# Patient Record
Sex: FEMALE | Race: WHITE | NOT HISPANIC OR LATINO | Employment: UNEMPLOYED | URBAN - METROPOLITAN AREA
[De-identification: names, ages, dates, MRNs, and addresses within clinical notes are randomized per-mention and may not be internally consistent; named-entity substitution may affect disease eponyms.]

---

## 2022-11-12 ENCOUNTER — OFFICE VISIT (OUTPATIENT)
Dept: URGENT CARE | Facility: CLINIC | Age: 62
End: 2022-11-12
Payer: COMMERCIAL

## 2022-11-12 VITALS
RESPIRATION RATE: 14 BRPM | WEIGHT: 136.4 LBS | HEART RATE: 52 BPM | HEIGHT: 62 IN | SYSTOLIC BLOOD PRESSURE: 122 MMHG | DIASTOLIC BLOOD PRESSURE: 80 MMHG | TEMPERATURE: 97.2 F | BODY MASS INDEX: 25.1 KG/M2 | OXYGEN SATURATION: 99 %

## 2022-11-12 DIAGNOSIS — S91.312A LACERATION OF LEFT FOOT, INITIAL ENCOUNTER: ICD-10-CM

## 2022-11-12 PROCEDURE — 90714 TD VACC NO PRESV 7 YRS+ IM: CPT

## 2022-11-12 PROCEDURE — 12002 RPR S/N/AX/GEN/TRNK2.6-7.5CM: CPT

## 2022-11-12 PROCEDURE — 90471 IMMUNIZATION ADMIN: CPT

## 2022-11-14 NOTE — PROGRESS NOTES
"Subjective:   Saira Pozo is a 62 y.o. female who presents for Foot Injury (X 1 day, cut on bottom on left foot)      HPI: This is a 62-year-old female who presents today for laceration to left foot.  Patient reports stepping on a piece of glass approximately 1 hour ago.  Patient reports pain is minimal.  She is not up-to-date on tetanus.  Numbness and tingling to left foot    ROS per HPI    Medications:    No current outpatient medications on file prior to visit.     No current facility-administered medications on file prior to visit.        Allergies:   Patient has no known allergies.    Problem List:   There is no problem list on file for this patient.       Surgical History:  No past surgical history on file.    Past Social Hx:   Social History     Tobacco Use    Smoking status: Never    Smokeless tobacco: Never   Vaping Use    Vaping Use: Never used   Substance Use Topics    Alcohol use: Not Currently    Drug use: Not Currently          Problem list, medications, and allergies reviewed by myself today in Epic.     Objective:     /80 (BP Location: Left arm, Patient Position: Sitting, BP Cuff Size: Adult)   Pulse (!) 52   Temp 36.2 °C (97.2 °F) (Temporal)   Resp 14   Ht 1.575 m (5' 2\")   Wt 61.9 kg (136 lb 6.4 oz)   SpO2 99%   BMI 24.95 kg/m²     Physical Exam  Vitals and nursing note reviewed.   Musculoskeletal:        Feet:    Feet:      Comments: 5 cm superficial laceration to bottom of left foot.  Bleeding controlled.  No foreign body identified   Skin:     General: Skin is warm and dry.      Capillary Refill: Capillary refill takes less than 2 seconds.   Neurological:      General: No focal deficit present.      Mental Status: She is oriented to person, place, and time. Mental status is at baseline.   Psychiatric:         Mood and Affect: Mood normal.         Behavior: Behavior normal.         Thought Content: Thought content normal.         Judgment: Judgment normal.   Procedure: " Laceration Repair of laceration of left foot  - Risks, benefits, methods, and alternatives of primary wound closure reviewed. Risks including bleeding, nerve damage, infection, and poor cosmetic outcome discussed at length.  - Verbal consent received from patient to proceed with laceration repair with Dermabond and Steri-Strips  - Wound length 5 cm, location left, straight laceration, subcutaneous tissue not visible, NVI, no signs of tendon injury.  - Area soaked with diluted chlorhexidine solution.  - Clean technique with sterile instruments.  - Area copiously irrigated with NS, wound explored, no foreign bodies identified  - Applied Dermabond and Steri-Strips; good wound edge approximation achieved.  - Minimal bleeding with good hemostasis achieved.   - non-adhesive dressing applied.  - There were no procedural complications.  - Patient tolerated procedure well.  - Tetanus updated in clinic today      Assessment/Plan:     Diagnosis and associated orders:   1. Laceration of left foot, initial encounter  TD Preservative Free =>6yo IM          Comments/MDM:   Pt is clinically stable at today's acute urgent care visit.  No acute distress noted. Appropriate for outpatient management at this time.       Dermabond/Steri-Strips Used:    Patient advised to monitor for signs of infection including, but not limited to, increased redness, warmth, pain, swelling, discharge, or fever. Wound care instructions provided. Avoid getting repair wet. Avoid creams, ointments, or oily substances. Keep wound clean, dry, and protected. Cover with clean dressing as needed, but replace any dressing used at least once every 24 hours or as needed. May apply ice packs, elevate, and take OTC acetaminophen/ibuprofen for pain/inflammation, per 's instructions, unless contraindicated. return to clinic for any signs or symptoms of infection.  Patient verbalized good understanding         Discussed DDx, management options  (risks,benefits, and alternatives to planned treatment), natural progression and supportive care.  Expressed understanding and the treatment plan was agreed upon. Questions were encouraged and answered   Return to urgent care prn if new or worsening sx or if there is no improvement in condition prn.    Educated in Red flags and indications to immediately call 911 or present to the Emergency Department.   Advised the patient to follow-up with the primary care physician for recheck, reevaluation, and consideration of further management.    I personally reviewed prior external notes and test results pertinent to today's visit.  I have independently reviewed and interpreted all diagnostics ordered during this urgent care acute visit.     Please note that this dictation was created using voice recognition software. I have made a reasonable attempt to correct obvious errors, but I expect that there are errors of grammar and possibly content that I did not discover before finalizing the note.    This note was electronically signed by VANCE Mcwilliams

## 2023-10-05 ENCOUNTER — OFFICE VISIT (OUTPATIENT)
Dept: URGENT CARE | Facility: CLINIC | Age: 63
End: 2023-10-05
Payer: COMMERCIAL

## 2023-10-05 VITALS
HEIGHT: 62 IN | TEMPERATURE: 97.8 F | WEIGHT: 137 LBS | DIASTOLIC BLOOD PRESSURE: 72 MMHG | RESPIRATION RATE: 14 BRPM | OXYGEN SATURATION: 96 % | HEART RATE: 56 BPM | SYSTOLIC BLOOD PRESSURE: 116 MMHG | BODY MASS INDEX: 25.21 KG/M2

## 2023-10-05 DIAGNOSIS — H92.09 OTALGIA, UNSPECIFIED LATERALITY: ICD-10-CM

## 2023-10-05 DIAGNOSIS — H69.90 DYSFUNCTION OF EUSTACHIAN TUBE, UNSPECIFIED LATERALITY: ICD-10-CM

## 2023-10-05 DIAGNOSIS — R09.82 PND (POST-NASAL DRIP): ICD-10-CM

## 2023-10-05 PROCEDURE — 3078F DIAST BP <80 MM HG: CPT | Performed by: NURSE PRACTITIONER

## 2023-10-05 PROCEDURE — 3074F SYST BP LT 130 MM HG: CPT | Performed by: NURSE PRACTITIONER

## 2023-10-05 PROCEDURE — 99213 OFFICE O/P EST LOW 20 MIN: CPT | Performed by: NURSE PRACTITIONER

## 2023-10-05 RX ORDER — FLUTICASONE PROPIONATE 50 MCG
2 SPRAY, SUSPENSION (ML) NASAL DAILY
Qty: 16 G | Refills: 0 | Status: SHIPPED | OUTPATIENT
Start: 2023-10-05

## 2023-10-05 NOTE — PROGRESS NOTES
"Saira Pozo is a 63 y.o. female who presents for Sore Throat (X 1 month, sore throat, Lt ear pain, sneezing, coughing.)      HPI  This is a new problem. Saira Pozo is a 63 y.o. patient who presents to urgent care with c/o: Left ear pain for 1 month. Sneezing, coughing , sore throat in the morning.  Cough is intermittent. Dry cough. Sneezing is daily. Sore throat in the morning then gets better when she drinks water.   Treatment tried: vitamins and fluids.   No other aggravating or alleviating factors.           ROS See HPI    Allergies:     No Known Allergies    PMSFS Hx:  History reviewed. No pertinent past medical history.  History reviewed. No pertinent surgical history.  History reviewed. No pertinent family history.  Social History     Tobacco Use    Smoking status: Never    Smokeless tobacco: Never   Substance Use Topics    Alcohol use: Not Currently       Problems:   There is no problem list on file for this patient.      Medications:   No current outpatient medications on file prior to visit.     No current facility-administered medications on file prior to visit.          Objective:     /72   Pulse (!) 56   Temp 36.6 °C (97.8 °F) (Temporal)   Resp 14   Ht 1.575 m (5' 2\")   Wt 62.1 kg (137 lb)   SpO2 96%   BMI 25.06 kg/m²     Physical Exam  Vitals and nursing note reviewed.   Constitutional:       General: She is not in acute distress.     Appearance: She is well-developed. She is not ill-appearing.   HENT:      Right Ear: Hearing, tympanic membrane, ear canal and external ear normal.      Left Ear: Hearing, tympanic membrane, ear canal and external ear normal.      Mouth/Throat:      Lips: Pink.   Cardiovascular:      Rate and Rhythm: Normal rate and regular rhythm.      Pulses: Normal pulses.      Heart sounds: Normal heart sounds.   Pulmonary:      Effort: Pulmonary effort is normal. No respiratory distress.      Breath sounds: Normal breath sounds.   Musculoskeletal:         " General: Normal range of motion.      Cervical back: Normal range of motion and neck supple.   Lymphadenopathy:      Head:      Right side of head: No tonsillar adenopathy.      Left side of head: No tonsillar adenopathy.      Cervical: No cervical adenopathy.      Upper Body:      Right upper body: No supraclavicular adenopathy.      Left upper body: No supraclavicular adenopathy.   Skin:     General: Skin is warm and dry.      Capillary Refill: Capillary refill takes less than 2 seconds.   Neurological:      Mental Status: She is alert and oriented to person, place, and time.      Deep Tendon Reflexes: Reflexes are normal and symmetric.   Psychiatric:         Mood and Affect: Mood normal.         Speech: Speech normal.         Behavior: Behavior normal.         Thought Content: Thought content normal.           Assessment /Associated Orders:      1. Dysfunction of Eustachian tube, unspecified laterality  fluticasone (FLONASE) 50 MCG/ACT nasal spray      2. PND (post-nasal drip)  fluticasone (FLONASE) 50 MCG/ACT nasal spray      3. Otalgia, unspecified laterality  fluticasone (FLONASE) 50 MCG/ACT nasal spray            Medical Decision Making:      Pt is clinically stable at today's acute urgent care visit.  No acute distress noted.  VSS. Appropriate for outpatient care at this time.   Acute problem today with uncertain prognosis. No bacterial infection seen on examination today.     OTC antihistamine of choice. Follow manufactures dosing and safety guidelines.     Cool mist humidifier at night prn   Keep well hydrated   Discussed Dx, management options (risks,benefits, and alternatives to planned treatment), natural progression and supportive care.  Expressed understanding and the treatment plan was agreed upon.   Questions were encouraged and answered   Return to urgent care prn if new or worsening sx or if there is no improvement in condition prn.    Educated in Red flags and indications to immediately call 911  or present to the Emergency Department.           Please note that this dictation was created using voice recognition software. I have worked with consultants from the vendor as well as technical experts from [facility] to optimize the interface. I have made every reasonable attempt to correct obvious errors, but I expect that there are errors of grammar and possibly content that I did not discover before finalizing the note.  This note was electronically signed by provider

## 2025-01-10 ENCOUNTER — OFFICE VISIT (OUTPATIENT)
Dept: URGENT CARE | Facility: CLINIC | Age: 65
End: 2025-01-10
Payer: COMMERCIAL

## 2025-01-10 VITALS
OXYGEN SATURATION: 96 % | RESPIRATION RATE: 18 BRPM | HEIGHT: 62 IN | TEMPERATURE: 98.6 F | BODY MASS INDEX: 25.21 KG/M2 | WEIGHT: 137 LBS | SYSTOLIC BLOOD PRESSURE: 102 MMHG | HEART RATE: 53 BPM | DIASTOLIC BLOOD PRESSURE: 78 MMHG

## 2025-01-10 DIAGNOSIS — J02.9 PHARYNGITIS, UNSPECIFIED ETIOLOGY: ICD-10-CM

## 2025-01-10 LAB — S PYO DNA SPEC NAA+PROBE: NOT DETECTED

## 2025-01-10 PROCEDURE — 99213 OFFICE O/P EST LOW 20 MIN: CPT | Performed by: PHYSICIAN ASSISTANT

## 2025-01-10 PROCEDURE — 3074F SYST BP LT 130 MM HG: CPT | Performed by: PHYSICIAN ASSISTANT

## 2025-01-10 PROCEDURE — 3078F DIAST BP <80 MM HG: CPT | Performed by: PHYSICIAN ASSISTANT

## 2025-01-10 PROCEDURE — 87651 STREP A DNA AMP PROBE: CPT | Performed by: PHYSICIAN ASSISTANT

## 2025-01-10 RX ORDER — PREDNISONE 20 MG/1
TABLET ORAL
Qty: 8 TABLET | Refills: 0 | Status: SHIPPED | OUTPATIENT
Start: 2025-01-10

## 2025-01-10 ASSESSMENT — ENCOUNTER SYMPTOMS
NAUSEA: 0
CHILLS: 0
SORE THROAT: 1
COUGH: 0
VOMITING: 0
FEVER: 0

## 2025-01-10 NOTE — PROGRESS NOTES
"Subjective     Saira Pozo is a 64 y.o. female who presents with Congestion (X1 month, Swollen tonsils, and earache )    HPI:  Saira Pozo is a 64 y.o. female who presents today for evaluation of sore throat and URI symptoms.  Patient reports that her symptoms started approximately 1 month ago but they have been gradually worsening.  Notes that she has taken some occasional vitamins but no other medications for symptoms.  Has not had any fever/chills or bodyaches.  No cough.  Says that she finally looked in her throat and saw a \"white spot/nodule\" on the left side.  She is supposed to be around her 2-month-old grandson this weekend and wants to make sure she will not pass anything on to him.        Review of Systems   Constitutional:  Positive for malaise/fatigue. Negative for chills and fever.   HENT:  Positive for congestion, ear pain and sore throat.    Respiratory:  Negative for cough.    Gastrointestinal:  Negative for nausea and vomiting.         PMH:  has no past medical history on file.  MEDS:   Current Outpatient Medications:     fluticasone (FLONASE) 50 MCG/ACT nasal spray, Administer 2 Sprays into affected nostril(S) every day. (Patient not taking: Reported on 1/10/2025), Disp: 16 g, Rfl: 0  ALLERGIES: No Known Allergies  SURGHX: No past surgical history on file.  SOCHX:  reports that she has never smoked. She has never used smokeless tobacco. She reports that she does not currently use alcohol. She reports that she does not currently use drugs.  FH: Family history was reviewed, no pertinent findings to report      Objective     /78   Pulse (!) 53   Temp 37 °C (98.6 °F) (Temporal)   Resp 18   Ht 1.575 m (5' 2\")   Wt 62.1 kg (137 lb)   SpO2 96%   BMI 25.06 kg/m²      Physical Exam  Constitutional:       Appearance: She is well-developed.   HENT:      Head: Normocephalic and atraumatic.      Right Ear: Tympanic membrane, ear canal and external ear normal.      Left Ear: Tympanic " membrane, ear canal and external ear normal.      Nose: Mucosal edema and congestion present. No rhinorrhea.      Mouth/Throat:      Lips: Pink.      Mouth: Mucous membranes are moist.      Pharynx: Posterior oropharyngeal erythema present.      Tonsils: 1+ on the right. 2+ on the left.      Comments: Left tonsil does exhibit an area of swelling with what appears to be a pocket of purulent material or encapsulated tonsil stone.  Eyes:      Conjunctiva/sclera: Conjunctivae normal.      Pupils: Pupils are equal, round, and reactive to light.   Cardiovascular:      Rate and Rhythm: Normal rate and regular rhythm.      Heart sounds: Normal heart sounds. No murmur heard.  Pulmonary:      Effort: Pulmonary effort is normal.      Breath sounds: Normal breath sounds. No wheezing.   Musculoskeletal:      Cervical back: Normal range of motion.   Lymphadenopathy:      Cervical: No cervical adenopathy.   Skin:     General: Skin is warm and dry.      Capillary Refill: Capillary refill takes less than 2 seconds.   Neurological:      Mental Status: She is alert and oriented to person, place, and time.   Psychiatric:         Behavior: Behavior normal.         Judgment: Judgment normal.       POCT GROUP A STREP, PCR - Negative      Assessment & Plan     1. Pharyngitis, unspecified etiology  - POCT GROUP A STREP, PCR  - amoxicillin-clavulanate (AUGMENTIN) 875-125 MG Tab; Take 1 Tablet by mouth 2 times a day for 7 days.  Dispense: 14 Tablet; Refill: 0  - predniSONE (DELTASONE) 20 MG Tab; Take 2 tabs once daily for 4 days  Dispense: 8 Tablet; Refill: 0  PCR strep test negative.  Given the fact that she has not only had sore throat but is also had some congestion and other upper respiratory symptoms during this time we will empirically treat with a course of antibiotics.  Short burst of oral steroids also sent.  Also recommend that she do so or gargles multiple times per day.  The inflammation on the left tonsil has a bit of an unusual  appearance.  If it does not resolve after this course of treatment she will likely need to follow-up with ear nose and throat specialist for more definitive evaluation and management and possible biopsy.        Differential Diagnosis, natural history, and supportive care discussed. Return to the Urgent Care or follow up with your PCP if symptoms fail to resolve, or for any new or worsening symptoms. Emergency room precautions discussed. Patient and/or family appears understanding of information.